# Patient Record
Sex: FEMALE | Race: WHITE | NOT HISPANIC OR LATINO | URBAN - METROPOLITAN AREA
[De-identification: names, ages, dates, MRNs, and addresses within clinical notes are randomized per-mention and may not be internally consistent; named-entity substitution may affect disease eponyms.]

---

## 2020-03-30 ENCOUNTER — OFFICE (AMBULATORY)
Dept: URBAN - METROPOLITAN AREA TELEHEALTH 4 | Facility: TELEHEALTH | Age: 55
End: 2020-03-30
Payer: COMMERCIAL

## 2020-03-30 DIAGNOSIS — R19.4 CHANGE IN BOWEL HABIT: ICD-10-CM

## 2020-03-30 DIAGNOSIS — R19.7 DIARRHEA, UNSPECIFIED: ICD-10-CM

## 2020-03-30 PROCEDURE — 99202 OFFICE O/P NEW SF 15 MIN: CPT | Performed by: INTERNAL MEDICINE

## 2020-03-30 RX ORDER — DICYCLOMINE HYDROCHLORIDE 20 MG/1
40 TABLET ORAL
Qty: 60 | Refills: 11 | Status: COMPLETED
Start: 2020-03-30 | End: 2020-12-10

## 2020-03-30 NOTE — SERVICEHPINOTES
This is a 54 year old woman with change in bowel habits.  She had symptoms about a month ago and this resolved.  She then had recurrent symptoms last week.  She was told that she had diverticulitis in the past.  She is now having diarrhea with up to 4 bowel movements daily.  She often has bowel movements after meals.  The stools are very loose.  She has not rectal bleeding.  She has had abdominal cramping.  The pain is in the lower abdomen and it is associated with nausea.  She was also having bloating and a full feeling.  She has never had a colonoscopy.  She has not taken OTC meds.

## 2020-12-10 ENCOUNTER — OFFICE (AMBULATORY)
Dept: URBAN - METROPOLITAN AREA CLINIC 64 | Facility: CLINIC | Age: 55
End: 2020-12-10
Payer: COMMERCIAL

## 2020-12-10 VITALS
WEIGHT: 183 LBS | HEART RATE: 96 BPM | HEIGHT: 65 IN | SYSTOLIC BLOOD PRESSURE: 111 MMHG | DIASTOLIC BLOOD PRESSURE: 62 MMHG

## 2020-12-10 DIAGNOSIS — K57.32 DIVERTICULITIS OF LARGE INTESTINE WITHOUT PERFORATION OR ABS: ICD-10-CM

## 2020-12-10 DIAGNOSIS — R10.11 RIGHT UPPER QUADRANT PAIN: ICD-10-CM

## 2020-12-10 DIAGNOSIS — Z12.11 ENCOUNTER FOR SCREENING FOR MALIGNANT NEOPLASM OF COLON: ICD-10-CM

## 2020-12-10 PROCEDURE — 99214 OFFICE O/P EST MOD 30 MIN: CPT | Performed by: NURSE PRACTITIONER

## 2021-01-18 ENCOUNTER — ON CAMPUS - OUTPATIENT (AMBULATORY)
Dept: URBAN - METROPOLITAN AREA HOSPITAL 2 | Facility: HOSPITAL | Age: 56
End: 2021-01-18
Payer: COMMERCIAL

## 2021-01-18 VITALS
SYSTOLIC BLOOD PRESSURE: 125 MMHG | DIASTOLIC BLOOD PRESSURE: 72 MMHG | TEMPERATURE: 98.1 F | OXYGEN SATURATION: 92 % | SYSTOLIC BLOOD PRESSURE: 136 MMHG | HEART RATE: 90 BPM | HEART RATE: 91 BPM | RESPIRATION RATE: 15 BRPM | OXYGEN SATURATION: 99 % | SYSTOLIC BLOOD PRESSURE: 129 MMHG | DIASTOLIC BLOOD PRESSURE: 98 MMHG | DIASTOLIC BLOOD PRESSURE: 86 MMHG | DIASTOLIC BLOOD PRESSURE: 75 MMHG | SYSTOLIC BLOOD PRESSURE: 110 MMHG | WEIGHT: 181 LBS | SYSTOLIC BLOOD PRESSURE: 109 MMHG | HEIGHT: 65 IN | DIASTOLIC BLOOD PRESSURE: 59 MMHG | OXYGEN SATURATION: 94 % | DIASTOLIC BLOOD PRESSURE: 35 MMHG | HEART RATE: 84 BPM | HEART RATE: 85 BPM | RESPIRATION RATE: 19 BRPM | DIASTOLIC BLOOD PRESSURE: 68 MMHG | SYSTOLIC BLOOD PRESSURE: 106 MMHG | RESPIRATION RATE: 16 BRPM | OXYGEN SATURATION: 98 % | HEART RATE: 88 BPM | HEART RATE: 100 BPM | OXYGEN SATURATION: 93 % | SYSTOLIC BLOOD PRESSURE: 163 MMHG | HEART RATE: 93 BPM | OXYGEN SATURATION: 97 % | DIASTOLIC BLOOD PRESSURE: 82 MMHG | DIASTOLIC BLOOD PRESSURE: 70 MMHG | SYSTOLIC BLOOD PRESSURE: 103 MMHG

## 2021-01-18 DIAGNOSIS — K29.80 DUODENITIS WITHOUT BLEEDING: ICD-10-CM

## 2021-01-18 DIAGNOSIS — K64.8 OTHER HEMORRHOIDS: ICD-10-CM

## 2021-01-18 DIAGNOSIS — K21.00 GASTRO-ESOPHAGEAL REFLUX DISEASE WITH ESOPHAGITIS, WITHOUT B: ICD-10-CM

## 2021-01-18 DIAGNOSIS — Z12.11 ENCOUNTER FOR SCREENING FOR MALIGNANT NEOPLASM OF COLON: ICD-10-CM

## 2021-01-18 DIAGNOSIS — K57.30 DIVERTICULOSIS OF LARGE INTESTINE WITHOUT PERFORATION OR ABS: ICD-10-CM

## 2021-01-18 DIAGNOSIS — R10.11 RIGHT UPPER QUADRANT PAIN: ICD-10-CM

## 2021-01-18 PROBLEM — K31.89 OTHER DISEASES OF STOMACH AND DUODENUM: Status: ACTIVE | Noted: 2021-01-18

## 2021-01-18 PROBLEM — K20.80 OTHER ESOPHAGITIS WITHOUT BLEEDING: Status: ACTIVE | Noted: 2021-01-18

## 2021-01-18 PROCEDURE — 43235 EGD DIAGNOSTIC BRUSH WASH: CPT | Performed by: INTERNAL MEDICINE

## 2021-01-18 PROCEDURE — G0121 COLON CA SCRN NOT HI RSK IND: HCPCS | Performed by: INTERNAL MEDICINE

## 2021-01-18 RX ORDER — FAMOTIDINE 40 MG/1
80 TABLET, FILM COATED ORAL
Qty: 180 | Refills: 3 | Status: ACTIVE

## 2021-02-15 ENCOUNTER — OFFICE (AMBULATORY)
Dept: URBAN - METROPOLITAN AREA CLINIC 64 | Facility: CLINIC | Age: 56
End: 2021-02-15
Payer: COMMERCIAL

## 2021-02-15 VITALS — HEIGHT: 65 IN

## 2021-02-15 DIAGNOSIS — K21.9 GASTRO-ESOPHAGEAL REFLUX DISEASE WITHOUT ESOPHAGITIS: ICD-10-CM

## 2021-02-15 PROCEDURE — 99212 OFFICE O/P EST SF 10 MIN: CPT | Mod: 95 | Performed by: INTERNAL MEDICINE

## 2022-11-27 ENCOUNTER — HOSPITAL ENCOUNTER (OUTPATIENT)
Facility: HOSPITAL | Age: 57
Discharge: HOME | End: 2022-11-27
Admitting: EMERGENCY MEDICINE

## 2022-11-27 ENCOUNTER — APPOINTMENT (OUTPATIENT)
Dept: GENERAL RADIOLOGY | Facility: HOSPITAL | Age: 57
End: 2022-11-27

## 2022-11-27 VITALS
HEIGHT: 65 IN | SYSTOLIC BLOOD PRESSURE: 142 MMHG | HEART RATE: 89 BPM | WEIGHT: 180 LBS | BODY MASS INDEX: 29.99 KG/M2 | RESPIRATION RATE: 16 BRPM | DIASTOLIC BLOOD PRESSURE: 78 MMHG | OXYGEN SATURATION: 95 % | TEMPERATURE: 99 F

## 2022-11-27 DIAGNOSIS — S92.502A CLOSED FRACTURE OF PHALANX OF LEFT FOURTH TOE, INITIAL ENCOUNTER: Primary | ICD-10-CM

## 2022-11-27 PROCEDURE — G0463 HOSPITAL OUTPT CLINIC VISIT: HCPCS | Performed by: NURSE PRACTITIONER

## 2022-11-27 PROCEDURE — 73660 X-RAY EXAM OF TOE(S): CPT

## 2022-11-27 PROCEDURE — 99203 OFFICE O/P NEW LOW 30 MIN: CPT | Performed by: NURSE PRACTITIONER

## 2022-11-27 RX ORDER — ACETAMINOPHEN 325 MG/1
TABLET ORAL
Status: COMPLETED
Start: 2022-11-27 | End: 2022-11-27

## 2022-11-27 RX ORDER — ACETAMINOPHEN 325 MG/1
650 TABLET ORAL ONCE
Status: COMPLETED | OUTPATIENT
Start: 2022-11-27 | End: 2022-11-27

## 2022-11-27 RX ORDER — LOSARTAN POTASSIUM AND HYDROCHLOROTHIAZIDE 12.5; 5 MG/1; MG/1
1 TABLET ORAL DAILY
COMMUNITY

## 2022-11-27 RX ORDER — ACETAMINOPHEN 500 MG
1000 TABLET ORAL ONCE
Status: DISCONTINUED | OUTPATIENT
Start: 2022-11-27 | End: 2022-11-27

## 2022-11-27 RX ORDER — OMEPRAZOLE 20 MG/1
20 CAPSULE, DELAYED RELEASE ORAL DAILY
COMMUNITY

## 2022-11-27 RX ORDER — ALBUTEROL SULFATE 90 UG/1
2 AEROSOL, METERED RESPIRATORY (INHALATION) EVERY 6 HOURS PRN
COMMUNITY

## 2022-11-27 RX ADMIN — ACETAMINOPHEN 650 MG: 325 TABLET, FILM COATED ORAL at 19:35

## 2022-11-27 RX ADMIN — ACETAMINOPHEN 650 MG: 325 TABLET ORAL at 19:35

## 2024-03-24 ENCOUNTER — APPOINTMENT (OUTPATIENT)
Dept: GENERAL RADIOLOGY | Facility: HOSPITAL | Age: 59
End: 2024-03-24
Payer: MEDICAID

## 2024-03-24 ENCOUNTER — HOSPITAL ENCOUNTER (OUTPATIENT)
Facility: HOSPITAL | Age: 59
Discharge: HOME | End: 2024-03-24
Attending: EMERGENCY MEDICINE | Admitting: EMERGENCY MEDICINE
Payer: MEDICAID

## 2024-03-24 VITALS
DIASTOLIC BLOOD PRESSURE: 87 MMHG | TEMPERATURE: 97.9 F | RESPIRATION RATE: 16 BRPM | HEIGHT: 65 IN | WEIGHT: 180 LBS | HEART RATE: 99 BPM | OXYGEN SATURATION: 95 % | BODY MASS INDEX: 29.99 KG/M2 | SYSTOLIC BLOOD PRESSURE: 168 MMHG

## 2024-03-24 DIAGNOSIS — M25.561 ACUTE PAIN OF RIGHT KNEE: Primary | ICD-10-CM

## 2024-03-24 DIAGNOSIS — S86.911A KNEE STRAIN, RIGHT, INITIAL ENCOUNTER: ICD-10-CM

## 2024-03-24 PROCEDURE — 73562 X-RAY EXAM OF KNEE 3: CPT

## 2024-03-24 PROCEDURE — G0463 HOSPITAL OUTPT CLINIC VISIT: HCPCS | Performed by: PHYSICIAN ASSISTANT

## 2024-03-24 PROCEDURE — 99213 OFFICE O/P EST LOW 20 MIN: CPT | Performed by: PHYSICIAN ASSISTANT

## 2024-03-24 NOTE — Clinical Note
[facility] FSED [place] [address] [place] [address] [place] [address]  Phone: [phone]    Anastasia Ibarra was seen and treated in our emergency department on 3/24/2024.  She may return to work on 03/28/2024.         Thank you for choosing [facility].    Abimbola Mcbride MD

## 2024-03-25 NOTE — DISCHARGE INSTRUCTIONS
Weightbearing as tolerated.  Ice 3-4 times a day.  Avoid those activities that exacerbate her symptoms.  Use the anti-inflammatory pain gel 3 times a day for the next 7 days as needed for pain.  Wear the knee brace provided for the next 7 days as needed.  If symptoms persist beyond 7 days recommend follow-up with your orthopedist or your family physician for further evaluation.

## 2024-03-25 NOTE — FSED PROVIDER NOTE
EMERGENCY DEPARTMENT ENCOUNTER    Room Number:  10/10  Date seen:  3/24/2024  Time seen: 20:42 EDT  PCP: Nuria Hines FNP  Historian: Patient    Discussed/obtained information from independent historians: N/A    HPI:  Chief complaint: Right knee pain  A complete HPI/ROS/PMH/PSH/SH/FH are unobtainable due to: Nothing  Context:Anastasia Ibarra is a 58 y.o. female who presents to the ED with c/o right knee pain.  Patient states knee pain began last night and was worse when she woke up this morning.  She feels she likely just moved funny because of the pain.  The pain is said to be anterior knee and feels like it is just below the kneecap.  She attempts to bear weight symptoms are exacerbated.  When she gets off her feet and is nonweightbearing the pain improves.  The pain does not radiate.  She denies right calf and lower extremity swelling.  She denies fever or chills associated with her knee pain.  There is been no chest pain or shortness of breath.  Patient is here for further evaluation.    External (non-ED) record review: There are limited records in epic to review.  Patient was seen by a Nila Mosqueda with [facility] on 7/8/2022 for palpitations.  She had an echo in April 2022 that showed normal EF and valves.  Stress test was ordered to ensure she did not have any ischemic disease.  Plan was to change the patient from her ARB to a beta-blocker if she continues to have symptoms.  Chronic or social conditions impacting care:    ALLERGIES  Erythromycin and Prednisone    PAST MEDICAL HISTORY  Active Ambulatory Problems     Diagnosis Date Noted    No Active Ambulatory Problems     Resolved Ambulatory Problems     Diagnosis Date Noted    No Resolved Ambulatory Problems     Past Medical History:   Diagnosis Date    Asthma     GERD (gastroesophageal reflux disease)     Hypertension        PAST SURGICAL HISTORY  Past Surgical History:   Procedure Laterality Date    CYST REMOVAL      DENTAL  PROCEDURE      HYSTERECTOMY         FAMILY HISTORY  History reviewed. No pertinent family history.    SOCIAL HISTORY  Social History     Socioeconomic History    Marital status:    Tobacco Use    Smoking status: Every Day     Current packs/day: 1.00     Types: Cigarettes   Substance and Sexual Activity    Alcohol use: Yes     Comment: rare    Drug use: Never       REVIEW OF SYSTEMS  Review of Systems    All systems reviewed and negative except for those discussed in HPI.     PHYSICAL EXAM    I have reviewed the triage vital signs and nursing notes.  Vitals:    03/24/24 2033   BP:    Pulse:    Resp:    Temp: 97.9 °F (36.6 °C)   SpO2:      Physical Exam    GENERAL: WDWN female, not distressed  HENT: nares patent  EYES: no scleral icterus  NECK: no ROM limitations  CV: regular rhythm, regular rate, DP and PT pulses +2 right lower extremity.  RESPIRATORY: normal effort  ABDOMEN: soft  : deferred  MUSCULOSKELETAL: Right knee: No effusion, TTP to superior and inferior to the patella.  Patient able to perform SLR without difficulty.  No laxity with varus valgus stress.  Negative laxity with Lachman's and anterior posterior drawer.  Calf and the remainder of the right lower extremity unremarkable.  Compartments right lower extremity soft.  NEURO: alert, moves all extremities, follows commands  SKIN: warm, dry, no erythema or signs of cellulitis.    LAB RESULTS  No results found for this or any previous visit (from the past 24 hour(s)).    Ordered the above labs and independently interpreted results.  My findings will be discussed in the ED course or medical decision making section below    RADIOLOGY RESULTS  XR Knee 3 View Right    Result Date: 3/24/2024  XR KNEE 3 VW RIGHT Date of Exam: 3/24/2024 8:36 PM EDT Indication: pain Comparison: None available. Findings: There is no acute fracture or dislocation. There is mild tricompartmental osteophyte formation. There is mild superior patellar enthesopathy. Joint  spaces appear normal.  No erosions or effusion. Soft tissues are unremarkable.     Impression: Mild degenerative change without acute osseous abnormality. Electronically Signed: Blane Graves MD  3/24/2024 9:05 PM EDT  Workstation ID: [id number]      Ordered the above noted radiological studies.  Independently interpreted by me.  My findings will be discussed in the medical decision section below.     PROGRESS, DATA ANALYSIS, CONSULTS AND MEDICAL DECISION MAKING    Please note that this section constitutes my independent interpretation of clinical data including lab results, radiology, EKG's.  This constitutes my independent professional opinion regarding differential diagnosis and management of this patient.  It may include any factors such as history from outside sources, review of external records, social determinants of health, management of medications, response to those treatments, and discussions with other providers.    ED Course as of 03/24/24 2130   Sun Mar 24, 2024   2125 I viewed the patient's right knee x-ray.  I see no obvious bony fracture or effusion.  The radiologist official read is mild DJD. [RC]   2126 This appears to be a more soft tissue injury.  On repeat exam both patient and I found the point of max tenderness in the soft tissue just medial and superior to the patella.  Will treat with diclofenac gel, knee brace, weightbearing as tolerated.  We will have her follow-up closely with her orthopedist if symptoms persist over the next 5 to 7 days for repeat evaluation. [RC]      ED Course User Index  [RC] Von Lee III, PA     Orders placed during this visit:  Orders Placed This Encounter   Procedures    [facility] 04.  Hinged Knee Brace    XR Knee 3 View Right            Medical Decision Making  Amount and/or Complexity of Data Reviewed  Radiology: ordered.    DJD exacerbation, patellofemoral pain syndrome, occult fracture, patella tendinitis, quadricep strain, other soft  tissue derangement of the right knee.  Obtain three-view plain films to further evaluate.  See workup portion of note for remainder of MDM process        DIAGNOSIS  Final diagnoses:   Acute pain of right knee   Knee strain, right, initial encounter          Medication List        New Prescriptions      Diclofenac Sodium 1 % gel gel  Commonly known as: VOLTAREN  Apply 4 g topically to the appropriate area as directed 3 (Three) Times a Day. As needed               Where to Get Your Medications        These medications were sent to [facility]/pharmacy #[id number] - [place], [place] - [address] [facility] - [phone]  - [phone]   [address] [place] [address]      Hours: 24-hours Phone: [phone]   Diclofenac Sodium 1 % gel gel         FOLLOW-UP  Nuria Hines, [facility]  [address] [facility] [place] [address] [phone]    Schedule an appointment as soon as possible for a visit   For further evaluation and treatment and interim management    Your orthopedist      If symptoms persist for greater than a week without any improvement        Latest Documented Vital Signs:  As of 21:30 EDT  BP- 168/87 HR- 99 Temp- 97.9 °F (36.6 °C) O2 sat- 95%    Appropriate PPE utilized throughout this patient encounter to include mask, if indicated, per current protocol. Hand hygiene was performed before donning PPE and after removal when leaving the room.    Please note that portions of this were completed with a voice recognition program.     Note Disclaimer: At [facility], we believe that sharing information builds trust and better relationships. You are receiving this note because you are receiving care at [facility] or recently visited. It is possible you will see health information before a provider has talked with you about it. This kind of information can be easy to misunderstand. To help you fully understand what it means for your health, we urge you to discuss this note with your  provider.

## 2024-12-03 ENCOUNTER — APPOINTMENT (OUTPATIENT)
Dept: CT IMAGING | Facility: HOSPITAL | Age: 59
End: 2024-12-03
Payer: MEDICAID

## 2024-12-03 ENCOUNTER — HOSPITAL ENCOUNTER (EMERGENCY)
Facility: HOSPITAL | Age: 59
Discharge: HOME | End: 2024-12-03
Attending: EMERGENCY MEDICINE | Admitting: EMERGENCY MEDICINE
Payer: MEDICAID

## 2024-12-03 VITALS
HEIGHT: 65 IN | TEMPERATURE: 98 F | BODY MASS INDEX: 31.47 KG/M2 | OXYGEN SATURATION: 93 % | WEIGHT: 188.9 LBS | RESPIRATION RATE: 16 BRPM | SYSTOLIC BLOOD PRESSURE: 129 MMHG | DIASTOLIC BLOOD PRESSURE: 75 MMHG | HEART RATE: 90 BPM

## 2024-12-03 DIAGNOSIS — B34.9 VIRAL ILLNESS: Primary | ICD-10-CM

## 2024-12-03 LAB
ALBUMIN SERPL-MCNC: 4 G/DL (ref 3.5–5.2)
ALBUMIN/GLOB SERPL: 1.3 G/DL
ALP SERPL-CCNC: 98 U/L (ref 39–117)
ALT SERPL W P-5'-P-CCNC: 13 U/L (ref 1–33)
ANION GAP SERPL CALCULATED.3IONS-SCNC: 5.4 MMOL/L (ref 5–15)
AST SERPL-CCNC: 12 U/L (ref 1–32)
BACTERIA UR QL AUTO: ABNORMAL /HPF
BASOPHILS # BLD AUTO: 0.03 10*3/MM3 (ref 0–0.2)
BASOPHILS NFR BLD AUTO: 0.3 % (ref 0–1.5)
BILIRUB SERPL-MCNC: 0.3 MG/DL (ref 0–1.2)
BILIRUB UR QL STRIP: NEGATIVE
BUN SERPL-MCNC: 17 MG/DL (ref 8–23)
BUN/CREAT SERPL: 20.7 (ref 7–25)
CALCIUM SPEC-SCNC: 10 MG/DL (ref 8.6–10.5)
CHLORIDE SERPL-SCNC: 101 MMOL/L (ref 98–107)
CLARITY UR: CLEAR
CO2 SERPL-SCNC: 34.6 MMOL/L (ref 22–29)
COLOR UR: YELLOW
CREAT SERPL-MCNC: 0.82 MG/DL (ref 0.57–1)
DEPRECATED RDW RBC AUTO: 48.7 FL (ref 37–54)
EGFRCR SERPLBLD CKD-EPI 2021: 82 ML/MIN/1.73
EOSINOPHIL # BLD AUTO: 0.1 10*3/MM3 (ref 0–0.4)
EOSINOPHIL NFR BLD AUTO: 0.9 % (ref 0.3–6.2)
ERYTHROCYTE [DISTWIDTH] IN BLOOD BY AUTOMATED COUNT: 13.3 % (ref 12.3–15.4)
FLUAV SUBTYP SPEC NAA+PROBE: NOT DETECTED
FLUBV RNA ISLT QL NAA+PROBE: NOT DETECTED
GLOBULIN UR ELPH-MCNC: 3.1 GM/DL
GLUCOSE SERPL-MCNC: 94 MG/DL (ref 65–99)
GLUCOSE UR STRIP-MCNC: NEGATIVE MG/DL
HCT VFR BLD AUTO: 48.5 % (ref 34–46.6)
HGB BLD-MCNC: 15 G/DL (ref 12–15.9)
HGB UR QL STRIP.AUTO: ABNORMAL
HYALINE CASTS UR QL AUTO: ABNORMAL /LPF
IMM GRANULOCYTES # BLD AUTO: 0.01 10*3/MM3 (ref 0–0.05)
IMM GRANULOCYTES NFR BLD AUTO: 0.1 % (ref 0–0.5)
KETONES UR QL STRIP: NEGATIVE
LEUKOCYTE ESTERASE UR QL STRIP.AUTO: NEGATIVE
LIPASE SERPL-CCNC: 51 U/L (ref 13–60)
LYMPHOCYTES # BLD AUTO: 2.88 10*3/MM3 (ref 0.7–3.1)
LYMPHOCYTES NFR BLD AUTO: 27.2 % (ref 19.6–45.3)
MCH RBC QN AUTO: 30.4 PG (ref 26.6–33)
MCHC RBC AUTO-ENTMCNC: 30.9 G/DL (ref 31.5–35.7)
MCV RBC AUTO: 98.2 FL (ref 79–97)
MONOCYTES # BLD AUTO: 0.76 10*3/MM3 (ref 0.1–0.9)
MONOCYTES NFR BLD AUTO: 7.2 % (ref 5–12)
NEUTROPHILS NFR BLD AUTO: 6.8 10*3/MM3 (ref 1.7–7)
NEUTROPHILS NFR BLD AUTO: 64.3 % (ref 42.7–76)
NITRITE UR QL STRIP: NEGATIVE
PH UR STRIP.AUTO: 7 [PH] (ref 5–8)
PLATELET # BLD AUTO: 209 10*3/MM3 (ref 140–450)
PMV BLD AUTO: 9.8 FL (ref 6–12)
POTASSIUM SERPL-SCNC: 4.1 MMOL/L (ref 3.5–5.2)
PROT SERPL-MCNC: 7.1 G/DL (ref 6–8.5)
PROT UR QL STRIP: NEGATIVE
RBC # BLD AUTO: 4.94 10*6/MM3 (ref 3.77–5.28)
RBC # UR STRIP: ABNORMAL /HPF
REF LAB TEST METHOD: ABNORMAL
SARS-COV-2 RNA RESP QL NAA+PROBE: NOT DETECTED
SODIUM SERPL-SCNC: 141 MMOL/L (ref 136–145)
SP GR UR STRIP: 1.02 (ref 1–1.03)
SQUAMOUS #/AREA URNS HPF: ABNORMAL /HPF
UROBILINOGEN UR QL STRIP: ABNORMAL
WBC # UR STRIP: ABNORMAL /HPF
WBC NRBC COR # BLD AUTO: 10.58 10*3/MM3 (ref 3.4–10.8)

## 2024-12-03 PROCEDURE — 80053 COMPREHEN METABOLIC PANEL: CPT

## 2024-12-03 PROCEDURE — 25010000002 ONDANSETRON PER 1 MG

## 2024-12-03 PROCEDURE — 99285 EMERGENCY DEPT VISIT HI MDM: CPT

## 2024-12-03 PROCEDURE — 87636 SARSCOV2 & INF A&B AMP PRB: CPT | Performed by: EMERGENCY MEDICINE

## 2024-12-03 PROCEDURE — 96375 TX/PRO/DX INJ NEW DRUG ADDON: CPT

## 2024-12-03 PROCEDURE — 83690 ASSAY OF LIPASE: CPT

## 2024-12-03 PROCEDURE — 74177 CT ABD & PELVIS W/CONTRAST: CPT

## 2024-12-03 PROCEDURE — 25510000001 IOPAMIDOL PER 1 ML: Performed by: EMERGENCY MEDICINE

## 2024-12-03 PROCEDURE — 85025 COMPLETE CBC W/AUTO DIFF WBC: CPT

## 2024-12-03 PROCEDURE — 81001 URINALYSIS AUTO W/SCOPE: CPT

## 2024-12-03 PROCEDURE — 96374 THER/PROPH/DIAG INJ IV PUSH: CPT

## 2024-12-03 RX ORDER — FAMOTIDINE 10 MG/ML
20 INJECTION, SOLUTION INTRAVENOUS ONCE
Status: COMPLETED | OUTPATIENT
Start: 2024-12-03 | End: 2024-12-03

## 2024-12-03 RX ORDER — SODIUM CHLORIDE 0.9 % (FLUSH) 0.9 %
10 SYRINGE (ML) INJECTION AS NEEDED
Status: DISCONTINUED | OUTPATIENT
Start: 2024-12-03 | End: 2024-12-03 | Stop reason: HOSPADM

## 2024-12-03 RX ORDER — ONDANSETRON 2 MG/ML
4 INJECTION INTRAMUSCULAR; INTRAVENOUS ONCE
Status: COMPLETED | OUTPATIENT
Start: 2024-12-03 | End: 2024-12-03

## 2024-12-03 RX ORDER — IOPAMIDOL 755 MG/ML
100 INJECTION, SOLUTION INTRAVASCULAR
Status: COMPLETED | OUTPATIENT
Start: 2024-12-03 | End: 2024-12-03

## 2024-12-03 RX ORDER — ONDANSETRON 4 MG/1
4 TABLET, ORALLY DISINTEGRATING ORAL EVERY 8 HOURS PRN
Qty: 12 TABLET | Refills: 0 | Status: SHIPPED | OUTPATIENT
Start: 2024-12-03

## 2024-12-03 RX ORDER — AZITHROMYCIN 200 MG/5ML
POWDER, FOR SUSPENSION ORAL
Qty: 38 ML | Refills: 0 | Status: SHIPPED | OUTPATIENT
Start: 2024-12-03 | End: 2024-12-08

## 2024-12-03 RX ORDER — LOPERAMIDE HYDROCHLORIDE 1 MG/5ML
2 SOLUTION ORAL 4 TIMES DAILY PRN
Qty: 118 ML | Refills: 0 | Status: SHIPPED | OUTPATIENT
Start: 2024-12-03

## 2024-12-03 RX ADMIN — ONDANSETRON 4 MG: 2 INJECTION, SOLUTION INTRAMUSCULAR; INTRAVENOUS at 17:34

## 2024-12-03 RX ADMIN — ALUMINUM HYDROXIDE, MAGNESIUM HYDROXIDE, AND DIMETHICONE: 400; 400; 40 SUSPENSION ORAL at 17:34

## 2024-12-03 RX ADMIN — FAMOTIDINE 20 MG: 10 INJECTION INTRAVENOUS at 17:34

## 2024-12-03 RX ADMIN — IOPAMIDOL 100 ML: 755 INJECTION, SOLUTION INTRAVENOUS at 18:30

## 2024-12-03 NOTE — FSED PROVIDER NOTE
[facility]    EMERGENCY DEPARTMENT ENCOUNTER    Room Number:  09/09  Date seen:  12/3/2024  Time seen: 18:59 EST  PCP: Nuria Hines FNP  Historian: Patient    HPI:  Chief complaint: Abdominal pain  Context:Anastasia Ibarra is a 59 y.o. female who presents to the ED with c/o abdominal pain.  Patient reports that she has been having a cough since yesterday.  She reports that she initially thought that she may have pulled something on her side but then started having heartburn and diarrhea.  She reports that she does have history of GERD.  She denies any chest pain, shortness of breath.  She reports that the pain is on the right side of her abdomen.  She denies any radiation.  Patient is nontoxic in appearance.    Timing: Constant  Duration: Yesterday  Location: Right abdomen  Intensity/Severity: Moderate      MEDICAL RECORD REVIEW  Asthma, GERD, hypertension    ALLERGIES  Erythromycin and Prednisone    PAST MEDICAL HISTORY  Active Ambulatory Problems     Diagnosis Date Noted    No Active Ambulatory Problems     Resolved Ambulatory Problems     Diagnosis Date Noted    No Resolved Ambulatory Problems     Past Medical History:   Diagnosis Date    Asthma     GERD (gastroesophageal reflux disease)     Hypertension        PAST SURGICAL HISTORY  Past Surgical History:   Procedure Laterality Date    CYST REMOVAL      DENTAL PROCEDURE      HYSTERECTOMY         FAMILY HISTORY  History reviewed. No pertinent family history.    SOCIAL HISTORY  Social History     Socioeconomic History    Marital status:    Tobacco Use    Smoking status: Every Day     Current packs/day: 1.00     Types: Cigarettes   Substance and Sexual Activity    Alcohol use: Yes     Comment: rare    Drug use: Never       REVIEW OF SYSTEMS  Review of Systems    All systems reviewed and negative except for those discussed in HPI.     PHYSICAL EXAM    I have reviewed the triage vital signs and nursing  notes.    ED Triage Vitals   Temp Heart Rate Resp BP SpO2   12/03/24 1640 12/03/24 1640 12/03/24 1640 12/03/24 1640 12/03/24 1640   98 °F (36.7 °C) 109 20 141/77 94 %      Temp src Heart Rate Source Patient Position BP Location FiO2 (%)   12/03/24 1640 12/03/24 1738 12/03/24 1738 12/03/24 1738 --   Oral Monitor Sitting Right arm        Physical Exam  Constitutional:       Appearance: Normal appearance. She is not toxic-appearing.   HENT:      Nose: Nose normal.      Mouth/Throat:      Mouth: Mucous membranes are moist.   Eyes:      Extraocular Movements: Extraocular movements intact.      Pupils: Pupils are equal, round, and reactive to light.   Cardiovascular:      Rate and Rhythm: Normal rate and regular rhythm.      Pulses: Normal pulses.      Heart sounds: Normal heart sounds.   Pulmonary:      Effort: Pulmonary effort is normal.      Breath sounds: Normal breath sounds.   Abdominal:      General: Abdomen is protuberant. Bowel sounds are normal.      Palpations: Abdomen is soft.      Tenderness: There is abdominal tenderness in the right upper quadrant, right lower quadrant and suprapubic area. There is no right CVA tenderness, left CVA tenderness, guarding or rebound.   Musculoskeletal:         General: Normal range of motion.      Cervical back: Normal range of motion.   Skin:     General: Skin is warm.   Neurological:      General: No focal deficit present.      Mental Status: She is alert.   Psychiatric:         Mood and Affect: Mood normal.         Behavior: Behavior normal.         Vital signs and nursing notes reviewed.        LAB RESULTS  Recent Results (from the past 24 hours)   COVID-19 and FLU A/B PCR, 1 HR TAT - Swab, Nasopharynx    Collection Time: 12/03/24  4:41 PM    Specimen: Nasopharynx; Swab   Result Value Ref Range    COVID19 Not Detected Not Detected - Ref. Range    Influenza A PCR Not Detected Not Detected    Influenza B PCR Not Detected Not Detected   Comprehensive Metabolic Panel     Collection Time: 12/03/24  5:28 PM    Specimen: Blood   Result Value Ref Range    Glucose 94 65 - 99 mg/dL    BUN 17 8 - 23 mg/dL    Creatinine 0.82 0.57 - 1.00 mg/dL    Sodium 141 136 - 145 mmol/L    Potassium 4.1 3.5 - 5.2 mmol/L    Chloride 101 98 - 107 mmol/L    CO2 34.6 (H) 22.0 - 29.0 mmol/L    Calcium 10.0 8.6 - 10.5 mg/dL    Total Protein 7.1 6.0 - 8.5 g/dL    Albumin 4.0 3.5 - 5.2 g/dL    ALT (SGPT) 13 1 - 33 U/L    AST (SGOT) 12 1 - 32 U/L    Alkaline Phosphatase 98 39 - 117 U/L    Total Bilirubin 0.3 0.0 - 1.2 mg/dL    Globulin 3.1 gm/dL    A/G Ratio 1.3 g/dL    BUN/Creatinine Ratio 20.7 7.0 - 25.0    Anion Gap 5.4 5.0 - 15.0 mmol/L    eGFR 82.0 >60.0 mL/min/1.73   Lipase    Collection Time: 12/03/24  5:28 PM    Specimen: Blood   Result Value Ref Range    Lipase 51 13 - 60 U/L   Urinalysis With Microscopic If Indicated (No Culture) - Urine, Clean Catch    Collection Time: 12/03/24  5:28 PM    Specimen: Urine, Clean Catch   Result Value Ref Range    Color, UA Yellow Yellow, Straw    Appearance, UA Clear Clear    pH, UA 7.0 5.0 - 8.0    Specific Gravity, UA 1.020 1.005 - 1.030    Glucose, UA Negative Negative    Ketones, UA Negative Negative    Bilirubin, UA Negative Negative    Blood, UA Moderate (2+) (A) Negative    Protein, UA Negative Negative    Leuk Esterase, UA Negative Negative    Nitrite, UA Negative Negative    Urobilinogen, UA 0.2 E.U./dL 0.2 - 1.0 E.U./dL   CBC Auto Differential    Collection Time: 12/03/24  5:28 PM    Specimen: Blood   Result Value Ref Range    WBC 10.58 3.40 - 10.80 10*3/mm3    RBC 4.94 3.77 - 5.28 10*6/mm3    Hemoglobin 15.0 12.0 - 15.9 g/dL    Hematocrit 48.5 (H) 34.0 - 46.6 %    MCV 98.2 (H) 79.0 - 97.0 fL    MCH 30.4 26.6 - 33.0 pg    MCHC 30.9 (L) 31.5 - 35.7 g/dL    RDW 13.3 12.3 - 15.4 %    RDW-SD 48.7 37.0 - 54.0 fl    MPV 9.8 6.0 - 12.0 fL    Platelets 209 140 - 450 10*3/mm3    Neutrophil % 64.3 42.7 - 76.0 %    Lymphocyte % 27.2 19.6 - 45.3 %    Monocyte % 7.2 5.0 -  12.0 %    Eosinophil % 0.9 0.3 - 6.2 %    Basophil % 0.3 0.0 - 1.5 %    Immature Grans % 0.1 0.0 - 0.5 %    Neutrophils, Absolute 6.80 1.70 - 7.00 10*3/mm3    Lymphocytes, Absolute 2.88 0.70 - 3.10 10*3/mm3    Monocytes, Absolute 0.76 0.10 - 0.90 10*3/mm3    Eosinophils, Absolute 0.10 0.00 - 0.40 10*3/mm3    Basophils, Absolute 0.03 0.00 - 0.20 10*3/mm3    Immature Grans, Absolute 0.01 0.00 - 0.05 10*3/mm3       Ordered the above labs and independently reviewed the results.      RADIOLOGY RESULTS  CT Abdomen Pelvis With Contrast    Result Date: 12/3/2024  CT ABDOMEN PELVIS W CONTRAST Date of Exam: 12/3/2024 6:12 PM EST Indication: right sided abdominal pain. Comparison: None available. Technique: Axial CT images were obtained of the abdomen and pelvis following the uneventful intravenous administration of iodinated contrast. Sagittal and coronal reconstructions were performed.  Automated exposure control and iterative reconstruction methods were used. Findings: Visualized Chest: Small pericardial effusion. Otherwise unremarkable Liver: Small hypodensity with peripheral nodular enhancement noted within the left hepatic lobe, most likely representing a hemangioma. Otherwise unremarkable Gallbladder: The gallbladder is normal without evidence of radiopaque stones. Bile Ducts: No billiary dcutal dilation. Spleen: Spleen is normal in size and CT density. Pancreas: Pancreas is normal. There is no evidence of pancreatic mass or peripancreatic fluid. Adrenals: Adrenal glands are unremarkable. Kidneys: Kidneys are normal in size. There are no stones or hydronephrosis. Gastrointestinal: Diffuse wall thickening of the sigmoid colon, nonspecific. Colonic diverticulosis without evidence of acute diverticulitis. No evidence of high-grade bowel obstruction. Normal appendix. Bladder: The bladder is normal. Pelvis:  No suspecious mass. Peritoneum/Mesentery: No fluid collection, ascities, or free air.   Lymph Nodes: No  lymphadenopathy. Vasculature: Unremarkable Abdominal Wall: Unremarkable Bony Structures: No acute osseous abnormality     Impression: 1.Diffuse wall thickening of the sigmoid colon, nonspecific. Correlate for signs and symptoms of colitis. 2.Colonic diverticulosis without evidence of acute diverticulitis. 3.Small pericardial effusion. Electronically Signed: Oskar DO Lisette  12/3/2024 6:46 PM EST  Workstation ID: [id number]        I ordered the above noted radiological studies. Independently reviewed by me and discussed with radiologist.  See dictation above for official radiology interpretation.      Orders placed during this visit:  Orders Placed This Encounter   Procedures    COVID-19 and FLU A/B PCR, 1 HR TAT - Swab, Nasopharynx    CT Abdomen Pelvis With Contrast    Comprehensive Metabolic Panel    Lipase    Urinalysis With Microscopic If Indicated (No Culture) - Urine, Clean Catch    CBC Auto Differential    Urinalysis, Microscopic Only - Urine, Clean Catch    [place] Urine Culture Tube -    NPO Diet NPO Type: Strict NPO    Undress & Gown    Insert Peripheral IV    CBC & Differential    ED Acknowledgement Form Needed;           PROCEDURES    Procedures        MEDICATIONS GIVEN IN ER    Medications   sodium chloride 0.9 % flush 10 mL (has no administration in time range)   ondansetron (ZOFRAN) injection 4 mg (4 mg Intravenous Given 12/3/24 1734)   famotidine (PEPCID) injection 20 mg (20 mg Intravenous Given 12/3/24 1734)   GI Cocktail (aluminum-magnesium hydroxide-simethicone 400-40 MG/5ML suspension 30 ML) ( Oral Given 12/3/24 1734)   iopamidol (ISOVUE-370) 76 % injection 100 mL (100 mL Intravenous Given 12/3/24 1830)         PROGRESS, DATA ANALYSIS, CONSULTS, AND MEDICAL DECISION MAKING    All labs and radiology studies have been independently reviewed by me.          AS OF 19:01 EST VITALS:    BP - 129/75  HR - 90  TEMP - 98 °F (36.7 °C) (Temporal)  02 SATS - 93%    Medical Decision Making  Patient is a  59-year-old female who presents today initially for cough.  Upon further discussion with the patient she reports right-sided abdomen pain with diarrhea.  Patient had an IV established and blood work was obtained. Abdominal exam without peritoneal signs. No evidence of acute abdomen at this time, low suspicion for appendicitis given negative CT scan. Given work up, low suspicion for acute hepatobiliary disease (including acute cholecystitis or cholangitis), acute infectious processes (pneumonia, hepatitis, pyelonephritis), vascular catastrophe, bowel obstruction, or viscus perforation. Presentation not consistent with other acute, emergent causes of abdominal pain at this time.  I will send the patient home with a prescription for azithromycin, Imodium, Zofran.  Patient did request liquid medications.  She has an appointment to follow-up with her primary care provider coming up.  She was given return precautions with understanding.    Problems Addressed:  Viral illness: complicated acute illness or injury    Amount and/or Complexity of Data Reviewed  Labs: ordered.  Radiology: ordered.    Risk  OTC drugs.  Prescription drug management.          DIAGNOSIS  Final diagnoses:   Viral illness       New Medications Ordered This Visit   Medications    sodium chloride 0.9 % flush 10 mL    ondansetron (ZOFRAN) injection 4 mg    famotidine (PEPCID) injection 20 mg    GI Cocktail (aluminum-magnesium hydroxide-simethicone 400-40 MG/5ML suspension 30 ML)    iopamidol (ISOVUE-370) 76 % injection 100 mL    ondansetron ODT (ZOFRAN-ODT) 4 MG disintegrating tablet     Sig: Place 1 tablet on the tongue Every 8 (Eight) Hours As Needed for Nausea or Vomiting.     Dispense:  12 tablet     Refill:  0    loperamide (IMODIUM) 1 MG/5ML solution     Sig: Take 10 mL by mouth 4 (Four) Times a Day As Needed for Diarrhea.     Dispense:  118 mL     Refill:  0    azithromycin (ZITHROMAX) 200 MG/5ML suspension     Sig: Take 12.5 mL by mouth Daily for  1 day, THEN 6.3 mL Daily for 4 days. Give the patient 500 mg by mouth the first day then 250 mg by mouth daily for 4 days.     Dispense:  38 mL     Refill:  0           I performed hand hygiene on entry into the pt room and upon exit.      Part of this note may be an electronic transcription/translation of spoken language to printed text using the Dragon Dictation System.     Appropriate PPE worn during exam.    Dictated utilizing ZAINA PHARMAon dictation     Note Disclaimer: At [facility], we believe that sharing information builds trust and better relationships. You are receiving this note because you recently visited [facility]. It is possible you will see health information before a provider has talked with you about it. This kind of information can be easy to misunderstand. To help you fully understand what it means for your health, we urge you to discuss this note with your provider.

## 2024-12-03 NOTE — DISCHARGE INSTRUCTIONS
Take the prescribed antibiotic medicine you are given as directed until it is gone. Take it even if you feel better. It treats the infection and stops it from returning. Not taking all the medicine can make future infections hard to treat. Make sure you are plenty of fluids.  Eat a bland diet advance as tolerated.  Avoid any fried, fatty, greasy, spicy foods.  Follow-up with your primary care provider as previously discussed.  Return to emergency room for any new or worsening symptoms.

## 2024-12-03 NOTE — Clinical Note
[facility] [place] [address] [place] [address] [place] [address]  Phone: [phone]    Anastasia Ibarra was seen and treated in our emergency department on 12/3/2024.  She may return to work on 12/04/2024.         Thank you for choosing [facility].    Alisa [place] APRN

## 2024-12-03 NOTE — Clinical Note
[facility] [place] [address] [place] [address] [place] [address]  Phone: [phone]    Anastasia Ibarra was seen and treated in our emergency department on 12/3/2024.  She may return to work on 12/05/2024.         Thank you for choosing [facility].    Alisa [place] APRN